# Patient Record
Sex: FEMALE | Race: WHITE | NOT HISPANIC OR LATINO | Employment: FULL TIME | URBAN - METROPOLITAN AREA
[De-identification: names, ages, dates, MRNs, and addresses within clinical notes are randomized per-mention and may not be internally consistent; named-entity substitution may affect disease eponyms.]

---

## 2023-12-20 ENCOUNTER — TELEPHONE (OUTPATIENT)
Dept: BEHAVIORAL HEALTH | Facility: CLINIC | Age: 28
End: 2023-12-20

## 2023-12-20 ENCOUNTER — HOSPITAL ENCOUNTER (INPATIENT)
Age: 28
End: 2023-12-20
Attending: PSYCHIATRY & NEUROLOGY | Admitting: PSYCHIATRY & NEUROLOGY
Payer: COMMERCIAL

## 2023-12-20 ENCOUNTER — HOSPITAL ENCOUNTER (OUTPATIENT)
Facility: CLINIC | Age: 28
Setting detail: OBSERVATION
Discharge: HOME | End: 2023-12-22
Attending: EMERGENCY MEDICINE | Admitting: EMERGENCY MEDICINE
Payer: COMMERCIAL

## 2023-12-20 DIAGNOSIS — F43.10 PTSD (POST-TRAUMATIC STRESS DISORDER): Chronic | ICD-10-CM

## 2023-12-20 DIAGNOSIS — F31.30 BIPOLAR I DISORDER, MOST RECENT EPISODE DEPRESSED (H): ICD-10-CM

## 2023-12-20 DIAGNOSIS — T50.902A INTENTIONAL DRUG OVERDOSE, INITIAL ENCOUNTER (H): ICD-10-CM

## 2023-12-20 DIAGNOSIS — F60.3 BORDERLINE PERSONALITY DISORDER (H): Chronic | ICD-10-CM

## 2023-12-20 PROBLEM — T14.91XA SUICIDE ATTEMPT (H): Status: ACTIVE | Noted: 2023-12-20

## 2023-12-20 PROBLEM — F10.920 ALCOHOLIC INTOXICATION WITHOUT COMPLICATION (H): Status: ACTIVE | Noted: 2023-12-20

## 2023-12-20 LAB
ALBUMIN SERPL BCG-MCNC: 3.9 G/DL (ref 3.5–5.2)
ALP SERPL-CCNC: 62 U/L (ref 40–150)
ALT SERPL W P-5'-P-CCNC: 40 U/L (ref 0–50)
ANION GAP SERPL CALCULATED.3IONS-SCNC: 8 MMOL/L (ref 7–15)
APAP SERPL-MCNC: <5 UG/ML (ref 10–30)
AST SERPL W P-5'-P-CCNC: 42 U/L (ref 0–45)
BASOPHILS # BLD AUTO: 0 10E3/UL (ref 0–0.2)
BASOPHILS NFR BLD AUTO: 0 %
BILIRUB SERPL-MCNC: 0.2 MG/DL
BUN SERPL-MCNC: 10.6 MG/DL (ref 6–20)
CALCIUM SERPL-MCNC: 8.1 MG/DL (ref 8.6–10)
CHLORIDE SERPL-SCNC: 108 MMOL/L (ref 98–107)
CREAT SERPL-MCNC: 0.86 MG/DL (ref 0.51–0.95)
DEPRECATED HCO3 PLAS-SCNC: 25 MMOL/L (ref 22–29)
EGFRCR SERPLBLD CKD-EPI 2021: >90 ML/MIN/1.73M2
EOSINOPHIL # BLD AUTO: 0.1 10E3/UL (ref 0–0.7)
EOSINOPHIL NFR BLD AUTO: 1 %
ERYTHROCYTE [DISTWIDTH] IN BLOOD BY AUTOMATED COUNT: 12.5 % (ref 10–15)
ETHANOL SERPL-MCNC: 0.21 G/DL
GLUCOSE SERPL-MCNC: 106 MG/DL (ref 70–99)
HCT VFR BLD AUTO: 34.8 % (ref 35–47)
HGB BLD-MCNC: 11.8 G/DL (ref 11.7–15.7)
IMM GRANULOCYTES # BLD: 0 10E3/UL
IMM GRANULOCYTES NFR BLD: 0 %
LYMPHOCYTES # BLD AUTO: 2.6 10E3/UL (ref 0.8–5.3)
LYMPHOCYTES NFR BLD AUTO: 55 %
MAGNESIUM SERPL-MCNC: 2.1 MG/DL (ref 1.7–2.3)
MCH RBC QN AUTO: 33.4 PG (ref 26.5–33)
MCHC RBC AUTO-ENTMCNC: 33.9 G/DL (ref 31.5–36.5)
MCV RBC AUTO: 99 FL (ref 78–100)
MONOCYTES # BLD AUTO: 0.5 10E3/UL (ref 0–1.3)
MONOCYTES NFR BLD AUTO: 9 %
NEUTROPHILS # BLD AUTO: 1.7 10E3/UL (ref 1.6–8.3)
NEUTROPHILS NFR BLD AUTO: 35 %
NRBC # BLD AUTO: 0 10E3/UL
NRBC BLD AUTO-RTO: 0 /100
PHOSPHATE SERPL-MCNC: 2.9 MG/DL (ref 2.5–4.5)
PLATELET # BLD AUTO: 236 10E3/UL (ref 150–450)
POTASSIUM SERPL-SCNC: 3.9 MMOL/L (ref 3.4–5.3)
PROT SERPL-MCNC: 6.4 G/DL (ref 6.4–8.3)
RBC # BLD AUTO: 3.53 10E6/UL (ref 3.8–5.2)
SALICYLATES SERPL-MCNC: <0.3 MG/DL
SODIUM SERPL-SCNC: 141 MMOL/L (ref 135–145)
WBC # BLD AUTO: 4.9 10E3/UL (ref 4–11)

## 2023-12-20 PROCEDURE — G0378 HOSPITAL OBSERVATION PER HR: HCPCS

## 2023-12-20 PROCEDURE — 36415 COLL VENOUS BLD VENIPUNCTURE: CPT | Performed by: STUDENT IN AN ORGANIZED HEALTH CARE EDUCATION/TRAINING PROGRAM

## 2023-12-20 PROCEDURE — 80179 DRUG ASSAY SALICYLATE: CPT | Performed by: STUDENT IN AN ORGANIZED HEALTH CARE EDUCATION/TRAINING PROGRAM

## 2023-12-20 PROCEDURE — 99285 EMERGENCY DEPT VISIT HI MDM: CPT | Mod: 25

## 2023-12-20 PROCEDURE — 85025 COMPLETE CBC W/AUTO DIFF WBC: CPT | Performed by: STUDENT IN AN ORGANIZED HEALTH CARE EDUCATION/TRAINING PROGRAM

## 2023-12-20 PROCEDURE — 80053 COMPREHEN METABOLIC PANEL: CPT | Performed by: STUDENT IN AN ORGANIZED HEALTH CARE EDUCATION/TRAINING PROGRAM

## 2023-12-20 PROCEDURE — 82077 ASSAY SPEC XCP UR&BREATH IA: CPT | Performed by: STUDENT IN AN ORGANIZED HEALTH CARE EDUCATION/TRAINING PROGRAM

## 2023-12-20 PROCEDURE — 83735 ASSAY OF MAGNESIUM: CPT | Performed by: NURSE PRACTITIONER

## 2023-12-20 PROCEDURE — 96360 HYDRATION IV INFUSION INIT: CPT

## 2023-12-20 PROCEDURE — 250N000011 HC RX IP 250 OP 636: Performed by: NURSE PRACTITIONER

## 2023-12-20 PROCEDURE — 96361 HYDRATE IV INFUSION ADD-ON: CPT

## 2023-12-20 PROCEDURE — 250N000013 HC RX MED GY IP 250 OP 250 PS 637: Performed by: NURSE PRACTITIONER

## 2023-12-20 PROCEDURE — 80143 DRUG ASSAY ACETAMINOPHEN: CPT | Performed by: STUDENT IN AN ORGANIZED HEALTH CARE EDUCATION/TRAINING PROGRAM

## 2023-12-20 PROCEDURE — 258N000003 HC RX IP 258 OP 636: Performed by: EMERGENCY MEDICINE

## 2023-12-20 PROCEDURE — 84100 ASSAY OF PHOSPHORUS: CPT | Performed by: NURSE PRACTITIONER

## 2023-12-20 RX ORDER — LAMOTRIGINE 100 MG/1
100 TABLET ORAL DAILY
COMMUNITY
Start: 2023-11-24

## 2023-12-20 RX ORDER — RISPERIDONE 0.5 MG/1
0.5 TABLET ORAL DAILY
COMMUNITY
Start: 2023-12-11 | End: 2023-12-22

## 2023-12-20 RX ORDER — MULTIPLE VITAMINS W/ MINERALS TAB 9MG-400MCG
1 TAB ORAL DAILY
Status: DISCONTINUED | OUTPATIENT
Start: 2023-12-21 | End: 2023-12-22 | Stop reason: HOSPADM

## 2023-12-20 RX ORDER — LAMOTRIGINE 100 MG/1
100 TABLET ORAL DAILY
Status: DISCONTINUED | OUTPATIENT
Start: 2023-12-20 | End: 2023-12-22 | Stop reason: HOSPADM

## 2023-12-20 RX ORDER — HYDROXYZINE PAMOATE 25 MG/1
25-50 CAPSULE ORAL 2 TIMES DAILY PRN
COMMUNITY
Start: 2023-11-24 | End: 2023-12-22

## 2023-12-20 RX ORDER — FLUMAZENIL 0.1 MG/ML
0.2 INJECTION, SOLUTION INTRAVENOUS
Status: DISCONTINUED | OUTPATIENT
Start: 2023-12-20 | End: 2023-12-21

## 2023-12-20 RX ORDER — OLANZAPINE 5 MG/1
5-10 TABLET, ORALLY DISINTEGRATING ORAL EVERY 6 HOURS PRN
Status: DISCONTINUED | OUTPATIENT
Start: 2023-12-20 | End: 2023-12-22 | Stop reason: HOSPADM

## 2023-12-20 RX ORDER — HALOPERIDOL 5 MG/ML
2.5-5 INJECTION INTRAMUSCULAR EVERY 6 HOURS PRN
Status: DISCONTINUED | OUTPATIENT
Start: 2023-12-20 | End: 2023-12-22 | Stop reason: HOSPADM

## 2023-12-20 RX ORDER — DIAZEPAM 5 MG
10 TABLET ORAL EVERY 30 MIN PRN
Status: DISCONTINUED | OUTPATIENT
Start: 2023-12-20 | End: 2023-12-21

## 2023-12-20 RX ORDER — RISPERIDONE 0.5 MG/1
0.5 TABLET ORAL DAILY
Status: DISCONTINUED | OUTPATIENT
Start: 2023-12-21 | End: 2023-12-21

## 2023-12-20 RX ORDER — SODIUM CHLORIDE/ALOE VERA
GEL (GRAM) NASAL 4 TIMES DAILY PRN
Status: DISCONTINUED | OUTPATIENT
Start: 2023-12-20 | End: 2023-12-20

## 2023-12-20 RX ORDER — DIAZEPAM 10 MG/2ML
5-10 INJECTION, SOLUTION INTRAMUSCULAR; INTRAVENOUS EVERY 30 MIN PRN
Status: DISCONTINUED | OUTPATIENT
Start: 2023-12-20 | End: 2023-12-21

## 2023-12-20 RX ORDER — FOLIC ACID 1 MG/1
1 TABLET ORAL DAILY
Status: DISCONTINUED | OUTPATIENT
Start: 2023-12-21 | End: 2023-12-21

## 2023-12-20 RX ORDER — ONDANSETRON 4 MG/1
4 TABLET, ORALLY DISINTEGRATING ORAL EVERY 6 HOURS PRN
Status: DISCONTINUED | OUTPATIENT
Start: 2023-12-20 | End: 2023-12-22 | Stop reason: HOSPADM

## 2023-12-20 RX ORDER — ONDANSETRON 4 MG/1
4 TABLET, ORALLY DISINTEGRATING ORAL ONCE
Status: COMPLETED | OUTPATIENT
Start: 2023-12-20 | End: 2023-12-20

## 2023-12-20 RX ORDER — LAMOTRIGINE 100 MG/1
100 TABLET ORAL DAILY
Status: DISCONTINUED | OUTPATIENT
Start: 2023-12-21 | End: 2023-12-20

## 2023-12-20 RX ADMIN — ONDANSETRON 4 MG: 4 TABLET, ORALLY DISINTEGRATING ORAL at 18:41

## 2023-12-20 RX ADMIN — SODIUM CHLORIDE 1000 ML: 9 INJECTION, SOLUTION INTRAVENOUS at 11:26

## 2023-12-20 RX ADMIN — LAMOTRIGINE 100 MG: 100 TABLET ORAL at 22:29

## 2023-12-20 ASSESSMENT — ACTIVITIES OF DAILY LIVING (ADL)
ADLS_ACUITY_SCORE: 35

## 2023-12-20 ASSESSMENT — LIFESTYLE VARIABLES
ANXIETY: NO ANXIETY, AT EASE
AGITATION: NORMAL ACTIVITY
AUDITORY DISTURBANCES: NOT PRESENT
PAROXYSMAL SWEATS: 2
NAUSEA AND VOMITING: NO NAUSEA AND NO VOMITING
TREMOR: NO TREMOR
ORIENTATION AND CLOUDING OF SENSORIUM: ORIENTED AND CAN DO SERIAL ADDITIONS
HEADACHE, FULLNESS IN HEAD: NOT PRESENT
TOTAL SCORE: 2
VISUAL DISTURBANCES: NOT PRESENT

## 2023-12-20 NOTE — ED NOTES
[facility]  ED to EMPATH Checklist:      Goal for EMPATH: Suicidality    Current Behavior: Calm, Cooperative, and Sleeping    Safety Concerns: Suicidal, attempted overdose with medications.     Legal Hold Status: [facility]    Medically Cleared by ED provider: Yes    Patient Therapeutically Searched: Therapeutic search by ED staff (strings, belts, shoes, pockets, electronics, etc.)    Belongings: In room locker    Independent Ambulation at Baseline: Yes/No: Yes    Participates in Care/Conversation: Yes/No: Yes    Patient Informed about EMPATH: Yes/No: Yes    DEC: Ordered and completed    Patient Ready to be Transferred to EMPATH? Yes/No: Yes

## 2023-12-20 NOTE — ED TRIAGE NOTES
"Patient BIBA from apt in [place]. States that over the last 2 days has drank 1 L of vodka. At around 0900 patient took \"most\" of a bottle of hydroxyzine (25 mg tablets) and \"part\" of a bottle of Risperdal (0.5 mg tablets). States that this was an attempt to hurt herself and that she is \"done.\"     Triage Assessment (Adult)       Row Name 12/20/23 1030          Triage Assessment    Airway WDL WDL        Respiratory WDL    Respiratory WDL WDL        Skin Circulation/Temperature WDL    Skin Circulation/Temperature WDL WDL        Cardiac WDL    Cardiac WDL WDL        Peripheral/Neurovascular WDL    Peripheral Neurovascular WDL WDL        Cognitive/Neuro/Behavioral WDL    Cognitive/Neuro/Behavioral WDL --  intoxicated                     "

## 2023-12-20 NOTE — ED PROVIDER NOTES
ED ATTENDING PHYSICIAN NOTE:   I evaluated this patient in conjunction with Betty Wharton PA-C  I have participated in the care of the patient and personally performed key elements of the history, exam, and medical decision making.      HPI:   Myra Melo is a 28 year old female who presents after an intentional overdose on Risperdal and hydroxyzine in addition to drinking vodka last night and this morning.  She told her mother about her overdose which prompted her to come to the ER.  She admits that this was an intent to harm herself.  She denies taking anything else.  She notes that she is fatigued and feels somewhat short of breath on arrival to the ED.    Independent Historian:   None - Patient Only    Review of External Notes:   None     EXAM:   Physical Exam  Vitals and nursing note reviewed.   Constitutional:       General: She is not in acute distress.     Appearance: She is not ill-appearing.   HENT:      Head: Normocephalic and atraumatic.      Right Ear: External ear normal.      Left Ear: External ear normal.      Nose: Nose normal.      Mouth/Throat:      Mouth: Mucous membranes are moist.   Eyes:      Extraocular Movements: Extraocular movements intact.      Conjunctiva/sclera: Conjunctivae normal.   Cardiovascular:      Rate and Rhythm: Normal rate and regular rhythm.      Heart sounds: No murmur heard.  Pulmonary:      Effort: Pulmonary effort is normal. No respiratory distress.      Breath sounds: Normal breath sounds. No wheezing, rhonchi or rales.   Abdominal:      General: Abdomen is flat. Bowel sounds are normal. There is no distension.      Palpations: Abdomen is soft.      Tenderness: There is no abdominal tenderness. There is no guarding or rebound.   Musculoskeletal:         General: No deformity or signs of injury.      Cervical back: Normal range of motion and neck supple.   Skin:     General: Skin is warm and dry.      Findings: No rash.   Neurological:      Mental Status: She is  alert and oriented to person, place, and time.   Psychiatric:         Mood and Affect: Mood is depressed.         Behavior: Behavior normal.         Thought Content: Thought content includes suicidal ideation. Thought content includes suicidal plan.           Independent Interpretation (X-rays, CTs, rhythm strip):  None    Consultations/Discussion of Management or Tests:  1057 the PA discussed with poison control regarding the patient's overdose and they recommend observation for approximately 3 hours.    Social Determinants of Health affecting care:   None     MEDICAL DECISION MAKING/ASSESSMENT AND PLAN:   28-year-old female presenting with an intentional overdose.  Her lab work shows no signs of any other overdoses other than alcohol.  She is mildly hypotensive which responded to fluids but otherwise vitally stable.  We discussed with poison control and they recommended observation for several hours before they can be medically cleared.  Once she was medically cleared, we had her talk to DEC and they recommend that she come to empath once she is somewhat more alert.  Will continue to observe in the ED until she is appropriate for empath.  Patient was endorsed to Dr. Jones at the end of shift.     DIAGNOSIS:     ICD-10-CM    1. Intentional drug overdose, initial encounter (H)  T50.902A       2. Alcoholic intoxication without complication (H24)  F10.920                DISPOSITION:   Endorsed to Dr. Jones at end of shift pending clearance for empath once she is clinically sober.     Adeel Pederson MD  12/20/2023  [facility] EMERGENCY DEPT       Adeel Pederson MD  12/20/23 2245

## 2023-12-20 NOTE — ED NOTES
Sitter at bedside, stating that she is needed upstairs back on the floor. Charge RN notified that a sitter is needed for patient.

## 2023-12-20 NOTE — ED PROVIDER NOTES
"                                  Emergency Department                         Assumed Care Note      Patient signed out to me by Dr Pederson.    Briefly, Myra Melo is a 28 year old female is being evaluated for intentional overdose. Patient has been medically cleared from poison control and prior shift ED physician. Patient was evaluated by DEC  who recommends inpatient mental health admission.    /51   Pulse 101   Temp 98.5  F (36.9  C) (Oral)   Resp 19   Ht 1.651 m (5' 5\")   Wt 86.2 kg (190 lb)   SpO2 96%   BMI 31.62 kg/m      Thus far, studies reveal:     Labs Ordered and Resulted from Time of ED Arrival to Time of ED Departure   COMPREHENSIVE METABOLIC PANEL - Abnormal       Result Value    Sodium 141      Potassium 3.9      Carbon Dioxide (CO2) 25      Anion Gap 8      Urea Nitrogen 10.6      Creatinine 0.86      GFR Estimate >90      Calcium 8.1 (*)     Chloride 108 (*)     Glucose 106 (*)     Alkaline Phosphatase 62      AST 42      ALT 40      Protein Total 6.4      Albumin 3.9      Bilirubin Total 0.2     ETHYL ALCOHOL LEVEL - Abnormal    Alcohol ethyl 0.21 (*)    ACETAMINOPHEN LEVEL - Abnormal    Acetaminophen <5.0 (*)    CBC WITH PLATELETS AND DIFFERENTIAL - Abnormal    WBC Count 4.9      RBC Count 3.53 (*)     Hemoglobin 11.8      Hematocrit 34.8 (*)     MCV 99      MCH 33.4 (*)     MCHC 33.9      RDW 12.5      Platelet Count 236      % Neutrophils 35      % Lymphocytes 55      % Monocytes 9      % Eosinophils 1      % Basophils 0      % Immature Granulocytes 0      NRBCs per 100 WBC 0      Absolute Neutrophils 1.7      Absolute Lymphocytes 2.6      Absolute Monocytes 0.5      Absolute Eosinophils 0.1      Absolute Basophils 0.0      Absolute Immature Granulocytes 0.0      Absolute NRBCs 0.0     SALICYLATE LEVEL - Normal    Salicylate <0.3         No orders to display       Pending studies include: None    Plan from sign out is: Once patient clinically sober and awake, able to " transition to Empath unit for further treatment/evaluation.    Progress notes:  ED Course as of 12/20/23 1709   Wed Dec 20, 2023   1709 Patient awake and willing to go to EMPATH unit.        Clinical impression:  1. Intentional drug overdose, initial encounter (H)    2. Alcoholic intoxication without complication (H24)          Disposition:  The patient was transferred to EMPATH.    Clem Jones DO  12/20/2023       Clem Jones DO  12/20/23 2056

## 2023-12-20 NOTE — ED NOTES
Bed: ED04  Expected date:   Expected time:   Means of arrival:   [place]  [facility] [place] eta 1023
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
## 2023-12-20 NOTE — ED NOTES
Patient sitting in bed calmly. Mother at bedside. No sitter available. On all monitors. Update given and lunch ordered.
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
## 2023-12-21 ENCOUNTER — TELEPHONE (OUTPATIENT)
Dept: BEHAVIORAL HEALTH | Facility: CLINIC | Age: 28
End: 2023-12-21
Payer: COMMERCIAL

## 2023-12-21 LAB
AMPHETAMINES UR QL SCN: ABNORMAL
BARBITURATES UR QL SCN: ABNORMAL
BENZODIAZ UR QL SCN: ABNORMAL
BZE UR QL SCN: ABNORMAL
CANNABINOIDS UR QL SCN: ABNORMAL
FENTANYL UR QL: ABNORMAL
HCG UR QL: NEGATIVE
OPIATES UR QL SCN: ABNORMAL
PCP QUAL URINE (ROCHE): ABNORMAL
SARS-COV-2 RNA RESP QL NAA+PROBE: NEGATIVE

## 2023-12-21 PROCEDURE — 80307 DRUG TEST PRSMV CHEM ANLYZR: CPT | Performed by: NURSE PRACTITIONER

## 2023-12-21 PROCEDURE — 87635 SARS-COV-2 COVID-19 AMP PRB: CPT | Performed by: NURSE PRACTITIONER

## 2023-12-21 PROCEDURE — G0378 HOSPITAL OBSERVATION PER HR: HCPCS

## 2023-12-21 PROCEDURE — 99223 1ST HOSP IP/OBS HIGH 75: CPT | Performed by: PSYCHIATRY & NEUROLOGY

## 2023-12-21 PROCEDURE — 250N000013 HC RX MED GY IP 250 OP 250 PS 637: Performed by: NURSE PRACTITIONER

## 2023-12-21 PROCEDURE — 81025 URINE PREGNANCY TEST: CPT | Performed by: PSYCHIATRY & NEUROLOGY

## 2023-12-21 RX ORDER — RISPERIDONE 0.5 MG/1
0.5 TABLET ORAL DAILY
Status: DISCONTINUED | OUTPATIENT
Start: 2023-12-22 | End: 2023-12-22 | Stop reason: HOSPADM

## 2023-12-21 RX ADMIN — LAMOTRIGINE 100 MG: 100 TABLET ORAL at 09:55

## 2023-12-21 RX ADMIN — MULTIPLE VITAMINS W/ MINERALS TAB 1 TABLET: TAB at 09:55

## 2023-12-21 ASSESSMENT — ACTIVITIES OF DAILY LIVING (ADL)
ADLS_ACUITY_SCORE: 35

## 2023-12-21 ASSESSMENT — LIFESTYLE VARIABLES
AGITATION: NORMAL ACTIVITY
ANXIETY: NO ANXIETY, AT EASE
HEADACHE, FULLNESS IN HEAD: NOT PRESENT
TOTAL SCORE: 2
AUDITORY DISTURBANCES: NOT PRESENT
PAROXYSMAL SWEATS: 2
VISUAL DISTURBANCES: NOT PRESENT
ORIENTATION AND CLOUDING OF SENSORIUM: ORIENTED AND CAN DO SERIAL ADDITIONS
NAUSEA AND VOMITING: NO NAUSEA AND NO VOMITING
TREMOR: NO TREMOR

## 2023-12-21 NOTE — CONSULTS
"Triage & Transition Services, Extended Care         Patient: Myra goes by \"Myra,\" uses she/her pronouns  Date of Service: December 21, 2023  Site of Service: [facility] EMERGENCY DEPT                             [id number]     Patient is followed related to: boarding status  Notable observations from today's encounter include: Attempted to meet with patient to discuss possible treatment options and to possibly complete an assessment.  While setting up the meeting with Empath staff, Empath staff stated \"she is declining to meet with you\".  This staff asked if for right now or permanently empath staff stated \"sounds like permanently\".  REYMUNDO consult will be closed at this time.        KALYAN Deshpande   "
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
## 2023-12-21 NOTE — ED NOTES
Pt awake to use BR and get water, CIWA score- 2 and pt denies any withdrawal symptoms when asked, mild diaphoresis noted, otherwise pt in no apparent distress or discomfort. Pt declines any medications offered at that time.

## 2023-12-21 NOTE — PROGRESS NOTES
Pt has been sleeping in recliner most of morning. Awakens easily to voice. CIWA discontinued this shift, patient denying any signs of withdrawal. VSS. Pt declined REYMUNDO assessment today. Pt eating minimally, 25% of breakfast and lunch, notes that she is just tired and rest is her main concern at this time. Pt met with provider and plan to remain on inpatient waitlist voluntarily. Reports SI is less intense than yesterday but still present. No changes to medications today.

## 2023-12-21 NOTE — ED PROVIDER NOTES
EmPATH Unit - Initial Psychiatric Observation Note  [facility] Emergency Department  Observation Initiation Date: Dec 20, 2023    Myra Melo MRN: [id number]   Age: 28 year old YOB: 1995     History     Chief Complaint   Patient presents with    Drug Overdose     HPI  Myra Melo is a 28 year old female with a past history notable for bipolar 1 disorder, PTSD, and borderline personality disorder who presented to the emergency department after an overdose with suicidal intent.  Records indicate she had overdosed on a large quantity of Risperdal and hydroxyzine combined with alcohol.  She was monitored in the emergency department until she was determined to be medically stable then transferred to the EmPATH unit for psychiatric assessment.  She is now approaching 24 hours in the emergency department.  On examination, the patient was sleeping comfortably in her recliner.  She accompanied me to the interview room for our meeting.  She reports that she feels slightly better in comparison to her initial arrival.  She endorses residual depressed mood while admitting that she has become increasingly frustrated with interpersonal conflicts at home involving her mother and sister.  She relates the recent overdose as being related to those ongoing conflicts.  She feels as though they often team up against her.  Moving out of the home is not an option at the moment leading her to feel helpless and hopeless in that situation.  During a moment where she was feeling overwhelmed by that scenario, she consumed a large quantity of alcohol and impulsively overdosed on a combination of Risperdal and hydroxyzine.  Today, she denied active suicidal thoughts.  She denied psychotic symptoms.  Prior to this incident, she interprets making progress in managing symptoms of her bipolar disorder and was content with her medication plan.  She is not seeking any additional medication changes today and is happy to restart  "her medications.  She was made aware of her treatment plan and is in agreement.    Past Medical History  No past medical history on file.  No past surgical history on file.  hydrOXYzine ligia (VISTARIL) 25 MG capsule  lamoTRIgine (LAMICTAL) 100 MG tablet  risperiDONE (RISPERDAL) 0.5 MG tablet      Allergies   Allergen Reactions    Zoloft [Sertraline]      Family History  No family history on file.  Social History           Review of Systems  A medically appropriate review of systems was performed with pertinent positives and negatives noted in the HPI, and all other systems negative.    Physical Examination   BP: 107/65  Pulse: 98  Temp: 98.5  F (36.9  C)  Resp: 16  Height: 165.1 cm (5' 5\")  Weight: 86.2 kg (190 lb)  SpO2: 96 %    Physical Exam  General: Appears stated age.   Neuro: Alert and fully oriented. Extremities appear to demonstrate normal strength on visual inspection.   Integumentary/Skin: no rash visualized, normal color    Psychiatric Examination   Appearance: fatigued  Attitude:  cooperative  Eye Contact:  fair  Mood:  better  Affect:  intensity is blunted  Speech:  clear, coherent  Psychomotor Behavior:  no evidence of tardive dyskinesia, dystonia, or tics  Thought Process:  logical and linear  Associations:  no loose associations  Thought Content:  no evidence of suicidal ideation or homicidal ideation and no evidence of psychotic thought  Insight:  fair  Judgement:  fair  Oriented to:  time, person, and place  Attention Span and Concentration:  fair  Recent and Remote Memory:  fair  Language: able to name/identify objects without impairment  Fund of Knowledge: intact with awareness of current and past events    ED Course     ED Course as of 12/21/23 1008   Wed Dec 20, 2023   1057 Consulted with poison control regarding patient   1709 Patient awake and willing to go to EMPATH unit.        Labs Ordered and Resulted from Time of ED Arrival to Time of ED Departure   COMPREHENSIVE METABOLIC PANEL - " Abnormal       Result Value    Sodium 141      Potassium 3.9      Carbon Dioxide (CO2) 25      Anion Gap 8      Urea Nitrogen 10.6      Creatinine 0.86      GFR Estimate >90      Calcium 8.1 (*)     Chloride 108 (*)     Glucose 106 (*)     Alkaline Phosphatase 62      AST 42      ALT 40      Protein Total 6.4      Albumin 3.9      Bilirubin Total 0.2     ETHYL ALCOHOL LEVEL - Abnormal    Alcohol ethyl 0.21 (*)    ACETAMINOPHEN LEVEL - Abnormal    Acetaminophen <5.0 (*)    CBC WITH PLATELETS AND DIFFERENTIAL - Abnormal    WBC Count 4.9      RBC Count 3.53 (*)     Hemoglobin 11.8      Hematocrit 34.8 (*)     MCV 99      MCH 33.4 (*)     MCHC 33.9      RDW 12.5      Platelet Count 236      % Neutrophils 35      % Lymphocytes 55      % Monocytes 9      % Eosinophils 1      % Basophils 0      % Immature Granulocytes 0      NRBCs per 100 WBC 0      Absolute Neutrophils 1.7      Absolute Lymphocytes 2.6      Absolute Monocytes 0.5      Absolute Eosinophils 0.1      Absolute Basophils 0.0      Absolute Immature Granulocytes 0.0      Absolute NRBCs 0.0     SALICYLATE LEVEL - Normal    Salicylate <0.3     MAGNESIUM - Normal    Magnesium 2.1     PHOSPHORUS - Normal    Phosphorus 2.9     HCG QUALITATIVE URINE   COVID-19 VIRUS (CORONAVIRUS) BY PCR       Assessments & Plan (with Medical Decision Making)   Patient presenting with a suicide attempt involving an overdose of Risperdal, hydroxyzine, combined with alcohol.  Ongoing interpersonal conflicts with her mother and sister are described as being contributory.  The overdose appears to have been impulsive and associated with emotional distress stemming from a recent verbal conflict with family members.  Her treatment plan is focused on monitoring for mood stability while resuming her prior effective mood stabilizing medications. Nursing notes reviewed noting no acute issues.     I have reviewed the assessment completed by the [facility].     During the observation period, the patient  did not require medications for agitation, and did not require restraints/seclusion for patient and/or provider safety.     The patient was found to have a psychiatric condition that would benefit from an observation stay in the emergency department for further psychiatric stabilization and/or coordination of a safe disposition. The observation plan includes serial assessments of psychiatric condition, potential administration of medications if indicated, further disposition pending the patient's psychiatric course during the monitoring period.     Preliminary diagnosis:    ICD-10-CM    1. Intentional drug overdose, initial encounter (H)  T50.902A       2. Bipolar I disorder, most recent episode depressed (H)  F31.30       3. PTSD (post-traumatic stress disorder)  F43.10       4. Borderline personality disorder (H)  F60.3            Treatment Plan:  -Restart lamotrigine 100 mg daily for mood stabilization.  If residual mood symptoms persist, consider optimizing the dose towards 200 mg daily.  -Continue to hold Risperdal today given her recent overdose.  Plan to restart 0.5 mg daily tomorrow for ongoing mood stabilization.  -Urine drug screen and pregnancy test have been ordered  -Enter to observation status as we await bed availability to transfer to inpatient psychiatry.  If her presentation continues to improve, we will continue to revisit her disposition plan and update accordingly.    --  Daren Coelho MD   [facility] EMERGENCY DEPT  EmPATH Unit       Daren Coelho MD  12/21/23 4210

## 2023-12-21 NOTE — CARE PLAN
Myra Melo  December 20, 2023  Plan of Care Hand-off Note     Patient Care Path: inpatient mental health    Plan for Care:   At this time, pt continues to endorse thoughts of suicide with some intent.  Though she reports willingness to keep herself safe or notify staff if she is having urges to harm herself here in the hospital.  She made a serious attempt at suicide this morning, has also been drinking daily and engaging in SIB to her thighs.  Her mood has been quite labile recently with increased verbal and physical aggression towards her sister with whom she lives.  She is impulsive and this, combined with continued SI and daily use of alcohol, puts her at high risk for suicide at this time. Recommend admission for safety and stabilization.  As she metabolizes the alcohol and medications and becomes more able to attend independently to ADLs, would recommend transfer to [facility].    Identified Goals and Safety Issues: Attempted suicide by overdose on meds and vodka. Continues to have thoughts of suicide.  Reports willingness to contract for safety in hospital    Overview:  Delmis Melo (Mother) [phone]            Legal Status: Legal Status at Admission: Voluntary/Patient has signed consent for treatment    Psychiatry Consult: will see psychiatric provider once transfers to [facility]       Updated MD and RN regarding plan of care.           Daljit Garcia, [facility]

## 2023-12-21 NOTE — CARE PLAN
[facility] Progress Note    Client Name: Myra Melo  Date: December 20, 2023  Service Type:  Group Therapy  Facilitator:LEVI Lamar, [facility]          Response:  Patient did not participate in group.      WIL Pak

## 2023-12-21 NOTE — TELEPHONE ENCOUNTER
R: METRO +1 HOUR     [facility] Access Inpatient Bed Call Log 12/21/23 @ : 7:05 am:    Intake has called facilities that have not updated the bed status within the last 12 hours.                                             [facility] is at capacity                        [facility] is posting 0 beds. [phone]; per call at 7:05 am to Anthony, they are at cap.                [facility] is posting 0 beds. Negative covid required.                             [facility] is posting 0 beds. Neg covid. No high school or [facility]. [phone]; per call at 7:06 am to Ese, she does not know their availability so said to call back later.                [facility] is posting 0 beds. [phone]               [facility] is posting 0 beds. [phone]               [facility] is posting 2 beds for Young Adults age 18-26. Negative covid. [phone]; per call at 7:08 am to Fabrice, they have 5 y/a beds avail                [facility] is posting 0 beds                       [facility] is posting  0  beds [phone]                     [facility] is posting  0 beds. LOW acuity ONLY. Mixed unit 12+. Negative covid- [phone]               [facility] has 0 beds posted. No aggression. Negative Covid. Low acuity                [facility] is posting 0 beds. Negative covid. [phone] voicemail says not to leave a message.                [facility] is posting  1  beds. Low acuity. No current aggression. [phone]      Pt remains on the work list pending appropriate bed availability.

## 2023-12-21 NOTE — PROGRESS NOTES
28 year old female received from ED due to suicide attempt. Patient intentionally overdosed on Risperdal and hydroxyzine in addition to drinking vodka last night and this morning. Medically cleared by ED MD, [facility] Patient reports some dizziness when sitting up and walking. VSS. Also, reports mild nausea and abdominal pain, interested in taking Zofran. Patient minimally engaged in conversation, appears slightly sedated. Patient requesting to rest at this time.     Nursing and risk assessments completed. Assessments reviewed with LMHP and physician. Admission information reviewed with patient. [facility] and instructions on using the facility. Questions regarding EmPATH addressed. Pt safety search completed.

## 2023-12-21 NOTE — PROGRESS NOTES
"Triage & Transition Services, Extended Care       Patient: Myra goes by \"Myra,\" uses she/her pronouns  Date of Service: December 21, 2023  Site of Service: [facility] EMERGENCY DEPT                             [id number]    Patient is followed related to: boarding status  Notable observations from today's encounter include: Attempted to meet with patient to discuss possible treatment options and to possibly complete an assessment.  While setting up the meeting with Empath staff, Empath staff stated \"she is declining to meet with you\".  This staff asked if for right now or permanently empath staff stated \"sounds like permanently\".  REYMUNDO consult will be closed at this time.      KALYAN Deshpande       "

## 2023-12-21 NOTE — TELEPHONE ENCOUNTER
R: [place]  Access Inpatient Bed Call Log 12/21/23 @ 1:00am   Intake has called facilities that have not updated their bed status within the last 12 hours.??      *METRO:  [place] -- [facility]: @ cap per website.  [place] -- [facility]:  @ cap per website.  [place] -- [facility]: @ cap per website.  [place] -- [facility]: @ cap per website. Low acuity only.  [place] -- [facility]: @ cap per website.  [facility] -- [facility]: @ cap per website.  [facility]/ beds - POSTING 2 BEDS. Ages 18-25, Voluntary only, NO aggression/physical/sexual assault, violence hx or drug abuse. Negative Covid. - Not Age Appropriate.   [place] -- [facility]: @ cap per website.  [place] -- [facility]: @ cap per website.  [place] -- [facility]: @ cap per website. Not reviewing until 10AM.     *Metro + 1 Hour:  [facility] - @ cap per website. Mixed unit/Low acuity only.   [facility] -- @ cap per website. Low acuity, No aggression  [facility] -- @ cap per website.   [facility] - POSTING 1 BED. Low acuity only. No current aggression.        Pt remains on waitlist pending appropriate placement availability

## 2023-12-21 NOTE — ED NOTES

## 2023-12-21 NOTE — ED NOTES
Pt appears to have slept/rested the majority of the noc shift in her assigned recliner, respirations even and unlabored during 15 minute safety checks.

## 2023-12-22 ENCOUNTER — TELEPHONE (OUTPATIENT)
Dept: BEHAVIORAL HEALTH | Facility: CLINIC | Age: 28
End: 2023-12-22
Payer: COMMERCIAL

## 2023-12-22 VITALS
HEIGHT: 65 IN | HEART RATE: 83 BPM | OXYGEN SATURATION: 97 % | TEMPERATURE: 97.6 F | WEIGHT: 193.5 LBS | RESPIRATION RATE: 16 BRPM | SYSTOLIC BLOOD PRESSURE: 129 MMHG | DIASTOLIC BLOOD PRESSURE: 84 MMHG | BODY MASS INDEX: 32.24 KG/M2

## 2023-12-22 PROCEDURE — G0378 HOSPITAL OBSERVATION PER HR: HCPCS

## 2023-12-22 PROCEDURE — 99239 HOSP IP/OBS DSCHRG MGMT >30: CPT | Performed by: PSYCHIATRY & NEUROLOGY

## 2023-12-22 PROCEDURE — 250N000013 HC RX MED GY IP 250 OP 250 PS 637: Performed by: PSYCHIATRY & NEUROLOGY

## 2023-12-22 PROCEDURE — 250N000013 HC RX MED GY IP 250 OP 250 PS 637: Performed by: NURSE PRACTITIONER

## 2023-12-22 RX ORDER — LAMOTRIGINE 100 MG/1
100 TABLET ORAL DAILY
Status: CANCELLED | OUTPATIENT
Start: 2023-12-22

## 2023-12-22 RX ORDER — ACETAMINOPHEN 325 MG/1
650 TABLET ORAL EVERY 4 HOURS PRN
Status: CANCELLED | OUTPATIENT
Start: 2023-12-22

## 2023-12-22 RX ORDER — RISPERIDONE 0.5 MG/1
0.5 TABLET ORAL DAILY
Status: CANCELLED | OUTPATIENT
Start: 2023-12-22

## 2023-12-22 RX ORDER — LANOLIN ALCOHOL/MO/W.PET/CERES
3 CREAM (GRAM) TOPICAL
Status: CANCELLED | OUTPATIENT
Start: 2023-12-22

## 2023-12-22 RX ORDER — POLYETHYLENE GLYCOL 3350 17 G/17G
17 POWDER, FOR SOLUTION ORAL DAILY PRN
Status: CANCELLED | OUTPATIENT
Start: 2023-12-22

## 2023-12-22 RX ORDER — RISPERIDONE 0.5 MG/1
0.5 TABLET ORAL DAILY
Qty: 7 TABLET | Refills: 0 | Status: SHIPPED | OUTPATIENT
Start: 2023-12-22 | End: 2023-12-29

## 2023-12-22 RX ORDER — HYDROXYZINE PAMOATE 25 MG/1
25-50 CAPSULE ORAL 2 TIMES DAILY PRN
Qty: 14 CAPSULE | Refills: 0 | Status: SHIPPED | OUTPATIENT
Start: 2023-12-22 | End: 2023-12-29

## 2023-12-22 RX ORDER — OLANZAPINE 10 MG/2ML
10 INJECTION, POWDER, FOR SOLUTION INTRAMUSCULAR 3 TIMES DAILY PRN
Status: CANCELLED | OUTPATIENT
Start: 2023-12-22

## 2023-12-22 RX ORDER — MAGNESIUM HYDROXIDE/ALUMINUM HYDROXICE/SIMETHICONE 120; 1200; 1200 MG/30ML; MG/30ML; MG/30ML
30 SUSPENSION ORAL EVERY 4 HOURS PRN
Status: CANCELLED | OUTPATIENT
Start: 2023-12-22

## 2023-12-22 RX ORDER — OLANZAPINE 10 MG/1
10 TABLET ORAL 3 TIMES DAILY PRN
Status: CANCELLED | OUTPATIENT
Start: 2023-12-22

## 2023-12-22 RX ORDER — HYDROXYZINE HYDROCHLORIDE 25 MG/1
25 TABLET, FILM COATED ORAL EVERY 4 HOURS PRN
Status: CANCELLED | OUTPATIENT
Start: 2023-12-22

## 2023-12-22 RX ADMIN — MULTIPLE VITAMINS W/ MINERALS TAB 1 TABLET: TAB at 09:20

## 2023-12-22 RX ADMIN — MELATONIN 5 MG TABLET 5 MG: at 01:55

## 2023-12-22 RX ADMIN — LAMOTRIGINE 100 MG: 100 TABLET ORAL at 09:27

## 2023-12-22 RX ADMIN — OLANZAPINE 5 MG: 5 TABLET, ORALLY DISINTEGRATING ORAL at 01:55

## 2023-12-22 RX ADMIN — RISPERIDONE 0.5 MG: 0.5 TABLET ORAL at 09:20

## 2023-12-22 ASSESSMENT — ACTIVITIES OF DAILY LIVING (ADL)
ADLS_ACUITY_SCORE: 35

## 2023-12-22 ASSESSMENT — COLUMBIA-SUICIDE SEVERITY RATING SCALE - C-SSRS
TOTAL  NUMBER OF INTERRUPTED ATTEMPTS SINCE LAST CONTACT: NO
2. HAVE YOU ACTUALLY HAD ANY THOUGHTS OF KILLING YOURSELF?: NO
TOTAL  NUMBER OF ABORTED OR SELF INTERRUPTED ATTEMPTS SINCE LAST CONTACT: NO
ATTEMPT SINCE LAST CONTACT: NO
1. SINCE LAST CONTACT, HAVE YOU WISHED YOU WERE DEAD OR WISHED YOU COULD GO TO SLEEP AND NOT WAKE UP?: NO
6. HAVE YOU EVER DONE ANYTHING, STARTED TO DO ANYTHING, OR PREPARED TO DO ANYTHING TO END YOUR LIFE?: NO
LETHALITY/MEDICAL DAMAGE CODE MOST LETHAL ACTUAL ATTEMPT: MODERATE PHYSICAL DAMAGE, MEDICAL ATTENTION NEEDED
MOST LETHAL DATE: 66828

## 2023-12-22 NOTE — TELEPHONE ENCOUNTER
R: [place]  Access Inpatient Bed Call Log  12/22/2023 1:18 AM  Intake has called facilities that have not updated their bed status within the last 12 hours.??      ADULTS:     *METRO  [place] -- [facility]: @ cap per website.  [place] -- [facility]:  @ cap per website.  [place] -- [facility]: @ Cap per website  [place] -- [facility]: @ cap per website.   [place] -- [facility]: @cap per website.  [facility] -- [facility]: @ cap per website.  [place] -- [facility]/YA beds Posting 4 beds. Ages 18-25, Voluntary only, COVID test req'd, NO aggression, physical or sexual assault, violence hx or drug abuse, or psychosis  [place] -- [facility]: @ cap per website.  [place] -- [facility]: @ cap per website.  [place] -- [facility]:  @ Cap per website.      *[facility] +1 Hour  [facility]: @ Cap per website. Mixed unit/Low acuity only.    [facility]: Posting 1 bed.  Low acuity, No aggression   [facility]: @ cap per website   [facility]:  Posting 2 bed. Low acuity only. No current aggression.    Pt remains on waitlist pending appropriate placement availability.    R: Patient cleared and ready for behavioral bed placement: Yes    2:14AM Intake paged the resident.     2:32 AM Intake received a call from the resident accepting this pt to [facility].     2:44 AM Intake called [facility] and provided disposition to CRN. Charge will review the pt's chart and call intake back.     4:10 AM Intake called [facility] and provided placement information to pt's nurse, Kimberli.

## 2023-12-22 NOTE — ED NOTES
Intake called to inform writer that pt has been accepted to [address] by Dr[address] Levy. Writer called the unit to give report and was informed that they have another pt in the queue before this patient and will call for report when they are ready to take her. Unit phone number [phone].

## 2023-12-22 NOTE — TELEPHONE ENCOUNTER
12:36 PM: Intake was informed via EC/EmPATh chat that Pt is discharging home and can be removed from IP list.    12:50 PM: Intake called station 22 to inform that Pt has been removed from queue.    12:51 PM: Pt removed from work list. Intake no longer following.

## 2023-12-22 NOTE — DISCHARGE INSTRUCTIONS
Aftercare Plan    Follow up with established providers and supports as scheduled, including your therapist and psychiatrist. We discussed the importance of asking your therapist to increase the frequency of sessions to twice weekly to increase your supports following discharge. Continue taking medications as prescribed. Abstain from drugs and alcohol. Utilize your [facility] mental [facility] crisis team as needed. They are available 24/7. Contact information is listed below.     If I am feeling unsafe or I am in a crisis, I will:   Contact my established care providers   Call the [place] Suicide Prevention Lifeline: [phone]   Go to the nearest emergency room   Call [phone]     Warning signs that I or other people might notice when a crisis is developing for me: changes to sleep, appetite or mood, increased anger, agitation or irritability, feeling depressed or hopeless, spending more time alone or talking less, increased crying, decreased productivity, seeing or hearing things that aren't there, thoughts of not wanting to live anymore or of actually killing myself, thoughts of hurting others    Things I am able to do on my own to cope or help me feel better: watching a favorite tv show or movie, listening to music I enjoy, going outside and breathing fresh air, going for a walk or exercising, taking a shower or bath, a cold or hot beverage, a healthy snack, drawing/coloring/painting, journaling, singing or dancing, deep breathing     I can try practicing square breathing when I begin to feel anxious - inhale through the nose for the count of 4 and the first line on the square. Exhale through the mouth for the count of 4 for the second line of the square. Repeat to complete the square. Repeat the square as many times as needed.    I can also use my five senses to practice mindfulness and grounding. What are five things I can see, four things I can hear, three things I can feel, two things I can smell, and one thing I can  taste.     Things that I am able to do with others to cope or help me feel better: sometimes just talking or spending time with someone else, sharing a meal or having coffee, watching a movie or playing a game, going for a walk or exercising    I can also use community resources including mental health hotlines, [facility] crisis teams, or apps.     Things I can use or do for distraction: movies/tv, music, reading, games, drawing/coloring/painting or other art, essential oils, exercise, cleaning/organizing, puzzles, crossword puzzles, word search, Sudoku       I can also download a meditation or relaxation medina, like Calm, Headspace, or Insight Timer (all three offer a free version)    Changes I can make to support my mental health and wellness: Attend scheduled mental health therapy and psychiatric appointments. Take my medications as prescribed. Maintain a daily schedule/routine. Abstain from all mood altering substances, including drugs, alcohol, or medications not currently prescribed to me. Implement a self-care routine.      People in my life that I can ask for help: friends or family, trusted teachers/staff/colleagues, trusted members of my community or place of Yarsanism, mental health crisis lines, or 911    Your [facility] has a mental health crisis team you can call 24/7: [facility], [phone]    Other things that are important when I m in crisis: to remember that the feelings I am having right now are temporary, and it won't feel like this forever, and that it is okay and important to ask for help    Crisis Lines  Crisis Text Line  Text 691568  You will be connected with a trained live crisis counselor to provide support.    Por espanol, texto  EARLE a 674076 o texto a 442-AYUDAME en WhatsApp    [place] Line  1.800.SUICIDE [5927701]      Community Resources  Fast Tracker  Linking people to mental health and substance use disorder resources  [employer].org     Merged with [facility]  "Line  Peer to peer support  Monday thru Saturday, 12 pm to 10 pm  993.854.3271 or 6.059.349.3957  Text \"Support\" to 10539    National [place] on Mental Illness (COLTEN)  590.723.5820 or 1.888.COLTEN.HELPS      Mental Health Apps  My3  https://The Fabricpp.org/    VirtualHopeBox  https://Likeability/apps/virtual-hope-box/      Additional Information  Today you were seen by a licensed mental health professional through Triage and Transition services, Behavioral Healthcare Providers ([facility])  for a crisis assessment in the Emergency Department at [facility].  It is recommended that you follow up with your established providers (psychiatrist, mental health therapist, and/or primary care doctor - as relevant) as soon as possible. Coordinators from [facility] will be calling you in the next 24-48 hours to ensure that you have the resources you need.  You can also contact [facility] coordinators directly at [phone]. You may have been scheduled for or offered an appointment with a mental health provider. [facility] maintains an extensive network of licensed behavioral health providers to connect patients with the services they need.  We do not charge providers a fee to participate in our referral network.  We match patients with providers based on a patient's specific needs, insurance coverage, and location.  Our first effort will be to refer you to a provider within your care system, and will utilize providers outside your care system as needed.        "

## 2023-12-22 NOTE — PROGRESS NOTES
Discharge instructions reviewed with patient including follow-up care plan. Educated on medication regimen and advised not to stop prescribed medication without consulting their physician. Reviewed safety plan and outpatient resources.  Pt denies plan/intent to act and has contracted for safety and completed safety plan with [facility].  All belongings which were brought into the hospital have been returned to patient. Escorted off the unit at 2:30 PM accompanied by MERT Ramon.  Discharged to home in stable condition via taxi.
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
## 2023-12-22 NOTE — H&P
"  ----------------------------------------------------------------------------------------------------------  [facility]   Psychiatry History and Physical    Name: Myra Melo   MRN#: [id number]  Age: 28 year old YOB: 1995    Date of Admission: (Not on file)  Attending Physician: ***     Contacts:     Primary Outpatient Psychiatrist: *** M.D. @ ***   Primary Physician: Abbi Bal  Therapist: ***  [facility] : ***  Probation/: ***  Family Members: ***     Chief Concern:     \"***\"     History of Present Illness:     Myra Melo is a 28 year old female with previous psychiatric diagnoses of *** admitted from the {admitplace:826964} on 12/22/2023 due to concern for {psychadmitreason:032064} in the context of {admitcontext:991586}.    [facility]/DEC Assessment:  ***    ED/Hospital Course:  Myra Melo was medically cleared for admission to inpatient psychiatric unit. In the ED, *** include brief ED course including psychiatric medications given, reason why, & response.     Patient interview:  ***     Psychiatric Review of Systems:     Depressive:   Reports {Depressive Sxs:221032}    Denies {Depressive Sxs:056802}   Dysregulation:    Reports {Dysregulation sxs:400360}    Denies {Dysregulation sxs:323043}   Psychosis:    Reports {Psychotic Sxs:081385}   Denies {Psychotic Sxs:685063}  Lena:    Reports {Lena Sxs:836652}   Denies {Lena Sxs:363064}  Anxiety:    Reports {Anxiety Sxs:845917}   Denies {Anxiety Sxs:393845}  PTSD:    Reports {PTSD Sxs:591947}   Denies {PTSD Sxs:861657}  ADHD:    Reports {ADHD Sxs:328676}   Denies {ADHD Sxs:446643}  Disordered Eating:   Reports {ED sxs:225430}, {:702189}  Denies {ED sxs:657899}, {:968023}  Cluster B:   Reports {clusterbsx:306680}  Denies {clusterbsx:297874}     Medical Review of Systems:     The Review of Systems is negative other than what is noted in the HPI.     Psychiatric " History:     Prior diagnoses: Previous psychiatric diagnoses include ***.     Hospitalizations: {wmwunrso061683}. Most recent hospitalization was at *** in ***/*** for ***.     Court Commitments: None per {patientreportchartreview:445580}. *** Currently committed with [facility] until *** . [facility] meds include ***     Suicide attempts: None per {patientreportchartreview:489717}.     Self-injurious behavior: None per {patientreportchartreview:885604}.     Violence towards others: None per {patientreportchartreview:026875}.     ECT/TMS: None per {patientreportchartreview:163230}.    Past medications:   Per {patientreportchartreview:984327}: ***     Substance Use History:     Alcohol: {endorsedeny:467610} ***      Nicotine: {endorsedeny:711679} ***    Illicit Substances: {endorsedeny:863521} current or past addiction to {cannabis, cocaine, amphetamines, opiates/heroin, ecstasy, benzos, opi:949453}    Chemical Dependency Treatment: {endorsedeny:775608} history of chemical dependency treatment      Social History:     Upbringing: Grew up in ***.     Family/Relationships: Single ***  *** . Does *** not maintain contact with Her family.     Living Situation: Currently lives in *** town) in a *** [facility] .     Education: Highest level of education obtained is a *** GED High school diploma some college Bachelors Masters Degree     Occupation: Works as a ***     Legal: {endorsedeny:040579} history of legal issues.     Guns: {guns:649598}    Abuse/Trauma: {endorsedeny:936167} history of trauma      Service: None ***    Spirituality: ***     Hobbies/Interests: ***      Past Medical/Surgical History:     {:2600385}  Reports *** Denies history of: {Head trauma, seizures, HIV, hepatitis:587057}  No past medical history on file.    {:1367981}  No past surgical history on file.     Family History:     Psychiatric Family Hx: {psyfamhx:590266}  No family history on file.     Allergies:      Allergies    Allergen Reactions    Zoloft [Sertraline]         Medications:     No medications prior to admission.       See current inpatient medications below.     Vitals and Physical Exam:     There were no vitals taken for this visit.    See ED assessment note by ED physician on ***.     Labs and Imaging:     Recent Results (from the past 72 hour(s))   Comprehensive metabolic panel    Collection Time: 12/20/23 11:02 AM   Result Value Ref Range    Sodium 141 135 - 145 mmol/L    Potassium 3.9 3.4 - 5.3 mmol/L    Carbon Dioxide (CO2) 25 22 - 29 mmol/L    Anion Gap 8 7 - 15 mmol/L    Urea Nitrogen 10.6 6.0 - 20.0 mg/dL    Creatinine 0.86 0.51 - 0.95 mg/dL    GFR Estimate >90 >60 mL/min/1.73m2    Calcium 8.1 (L) 8.6 - 10.0 mg/dL    Chloride 108 (H) 98 - 107 mmol/L    Glucose 106 (H) 70 - 99 mg/dL    Alkaline Phosphatase 62 40 - 150 U/L    AST 42 0 - 45 U/L    ALT 40 0 - 50 U/L    Protein Total 6.4 6.4 - 8.3 g/dL    Albumin 3.9 3.5 - 5.2 g/dL    Bilirubin Total 0.2 <=1.2 mg/dL   Alcohol ethyl    Collection Time: 12/20/23 11:02 AM   Result Value Ref Range    Alcohol ethyl 0.21 (H) <=0.01 g/dL   Acetaminophen level    Collection Time: 12/20/23 11:02 AM   Result Value Ref Range    Acetaminophen <5.0 (L) 10.0 - 30.0 ug/mL   Salicylate level    Collection Time: 12/20/23 11:02 AM   Result Value Ref Range    Salicylate <0.3   mg/dL   CBC with platelets and differential    Collection Time: 12/20/23 11:02 AM   Result Value Ref Range    WBC Count 4.9 4.0 - 11.0 10e3/uL    RBC Count 3.53 (L) 3.80 - 5.20 10e6/uL    Hemoglobin 11.8 11.7 - 15.7 g/dL    Hematocrit 34.8 (L) 35.0 - 47.0 %    MCV 99 78 - 100 fL    MCH 33.4 (H) 26.5 - 33.0 pg    MCHC 33.9 31.5 - 36.5 g/dL    RDW 12.5 10.0 - 15.0 %    Platelet Count 236 150 - 450 10e3/uL    % Neutrophils 35 %    % Lymphocytes 55 %    % Monocytes 9 %    % Eosinophils 1 %    % Basophils 0 %    % Immature Granulocytes 0 %    NRBCs per 100 WBC 0 <1 /100    Absolute Neutrophils 1.7 1.6 - 8.3 10e3/uL     "Absolute Lymphocytes 2.6 0.8 - 5.3 10e3/uL    Absolute Monocytes 0.5 0.0 - 1.3 10e3/uL    Absolute Eosinophils 0.1 0.0 - 0.7 10e3/uL    Absolute Basophils 0.0 0.0 - 0.2 10e3/uL    Absolute Immature Granulocytes 0.0 <=0.4 10e3/uL    Absolute NRBCs 0.0 10e3/uL   Magnesium    Collection Time: 12/20/23 11:02 AM   Result Value Ref Range    Magnesium 2.1 1.7 - 2.3 mg/dL   Phosphorus    Collection Time: 12/20/23 11:02 AM   Result Value Ref Range    Phosphorus 2.9 2.5 - 4.5 mg/dL   Asymptomatic COVID-19 Virus (Coronavirus) by PCR Nose    Collection Time: 12/21/23 10:53 AM    Specimen: Nose; Swab   Result Value Ref Range    SARS CoV2 PCR Negative Negative   HCG qualitative urine    Collection Time: 12/21/23  5:04 PM   Result Value Ref Range    hCG Urine Qualitative Negative Negative   Urine Drug Screen Panel    Collection Time: 12/21/23  5:04 PM   Result Value Ref Range    Amphetamines Urine Screen Negative Screen Negative    Barbituates Urine Screen Negative Screen Negative    Benzodiazepine Urine Screen Negative Screen Negative    Cannabinoids Urine Screen Positive (A) Screen Negative    Cocaine Urine Screen Negative Screen Negative    Fentanyl Qual Urine Screen Negative Screen Negative    Opiates Urine Screen Negative Screen Negative    PCP Urine Screen Negative Screen Negative        Mental Status Examination:     Oriented to:  {KKorientationMSE:404139}  General:  {KKGeneral:505661}  Appearance:  {KKappearanceMSE:966495}  Behavior/Attitude:  {KKbehaviorMSE:694328}  Eye Contact:  { :680263}  Psychomotor: { :937503} {KKcatatoniaMSE:568104}  Speech:  {KKspeechMSE:713840}  Language: Fluent in English with appropriate syntax and vocabulary.  Mood:  \"***\"  Affect:  {KKaffectMSE:046141}  Thought Process:  { :344412}  Thought Content:  {KKthoughtcontentMSE:256970}; {KKdelusionsMSE:484576}  Associations:  {KKassociationsMSE:992505}  Insight:  {KKinsightMSE:693208} due to ***  Judgment:  {KKjudgementMSE:767757} due to ***  Impulse " control: { :657348}  Attention Span:  {KKattentionMSE:961996}  Concentration:  {KKconcentrationMSE:672831}  Recent and Remote Memory:  {KKmemoryMSE:503159}  Fund of Knowledge:  {KKintellectMSE:044440}  Muscle Strength and Tone: { :985251}  Gait and Station: { :835189}     Psychiatric Assessment:     Myra Melo is a 28 year old female previously diagnosed with *** who presented voluntarily/by *** with *** in the context of ***. Most recent psychiatric hospitalization was***. Significant symptoms on admission include ***. The MSE on admission was pertinent for***. Biological contributions to mental health presentation include *** (previous diagnosis, medications, substance use, PMH). Psychological contributions to mental health presentation include *** (insight, coping, personality). Social factors contributing to mental health presentation include *** (support system, interpersonal conflicts, work, education, legal, financial). Protective factors include ***.     In summary, the patient's reported symptoms of *** in the context of *** are consistent with ***. (OR Patient's definitive diagnosis is still in evolution; differential includes ***.) She will likely benefit from *** this admission.    Given that she currently has {psychadmitreason:893992}, patient warrants inpatient psychiatric hospitalization to maintain her safety.      Psychiatric Plan by Diagnosis      # ***  1. Medications:  - ***     2. Pertinent Labs/Monitoring:   - *** (QTc, medication levels, ANC, etc)     3. Additional Plans:  - Patient will be treated in therapeutic milieu with appropriate individual and group therapies as described  - ***    # ***  -     Psychiatric Hospital Course:      (***for discharge summary - explain in past tense what was done this hospitalization, include medication choices/changes/rationale/efficacy)  Myra Melo was [facility] to [facility] as a voluntary patient/ on a 72 hour hold***.   Medications:  PTA ***  & *** were continued.   PTA **** and *** were held due to ***.    New medications started at the time of admission include ***.     The risks, benefits, alternatives, and side effects were discussed and understood by the patient*** and other caregivers***.     Medical Assessment and Plan     Medical diagnoses to be addressed this admission:    # ***    # ***     Medical course: (***medical work up (ie: admission labs), PTA medical problems, consult summary and changes) Patient was physically examined by the ED prior to being transferred to the unit and was found to be medically stable and appropriate for admission.     Consults:  {consultpsych:405219}     Checklist     Legal Status: ***    Safety Assessment:        Risk Assessment:  Risk for harm is {harm:072019}.  Risk factors: {RF harm:201906}  Protective factors: {pf harm:706203}     SIO: ***    Dispo: TBD. Disposition pending clinical stabilization, medication optimization and development of an appropriate discharge plan.     Attestations:     ***Evening Admissions:***  Patient to be staffed with the attending physician in the morning.  ***  Psychiatry Resident Physician    ***Overnight/Daytime Admissions:***  ***, MS3  [facility] Medical Student    I was present with the medical student who participated in the service and in the documentation of the note.  I have verified the history and personally performed the physical exam and medical decision making. I agree with the assessment and plan of care as documented in the note.***    This patient was seen and discussed with my attending physician.  ***  Psychiatry Resident Physician

## 2023-12-22 NOTE — ED NOTES
Pt appears to have slept/rested in her assigned recliner most of the noc shift, respirations even and unlabored during 15 minute safety checks.

## 2023-12-22 NOTE — PROGRESS NOTES
"Pt reported to RN that she no longer wishes to transfer to inpatient setting. Pt notes that she is not feeling suicidal today but does report some concern that SI will return when she gets home and \"has to deal with her family\". She asks to be discharged with safety planning instead of inpatient. Pt is voluntary at this time so EMS cancelled and [facility] has been updated.     Pt met with ANAIS, Vane, and awaiting provider reassessment.   "

## 2023-12-22 NOTE — ED PROVIDER NOTES
"EmPATH Unit - Psychiatric Observation Discharge Summary  [facility] Emergency Department  Discharge Date: 12/22/2023    Myra Melo MRN: [id number]   Age: 28 year old YOB: 1995     Brief HPI & Initial ED Course     Chief Complaint   Patient presents with    Drug Overdose     HPI  Myra Melo is a 28 year old female with history notable for bipolar 1 disorder, PTSD, and a borderline personality disorder who is currently under observation status on the EmPATH unit, now approaching 50 hours in the emergency department.  Overnight, there were no acute issues.  On reassessment, the patient reports that her mood has maintained stability since our last meeting.  She continues to deny depressive symptoms.  She has not noted or exhibited manic symptoms.  She denied overwhelming anxiety.  She is sleeping well at night.  Appetite and energy are fair.  She discussed a desire to return to work, often working the overnight shift, and hoping to save enough money to return back to [place] to reside with friends and supports in that area.  Noting her recent overdose, she inquired if she can receive a refill of medications that she no longer has access to.  She remains satisfied with her current medication plan.  She shares with me that her outpatient provider is monitoring her response to lamotrigine with plans to gradually titrate upwards.  She prefers not to adjust the dose today and plans to see her outpatient prescriber in about 1 week.  She denies suicidal and homicidal thoughts.  There is no indication of psychosis.  She interprets readiness to discharge home today.        Physical Examination   BP: 129/84  Pulse: 83  Temp: 97.6  F (36.4  C)  Resp: 16  Height: 165.1 cm (5' 5\")  Weight: 87.8 kg (193 lb 8 oz)  SpO2: 97 %    Physical Exam  General: Appears stated age.   Neuro: Alert and fully oriented. Extremities appear to demonstrate normal strength on visual inspection.   Integumentary/Skin: no rash " visualized, normal color    Psychiatric Examination   Appearance: awake, alert  Attitude:  cooperative  Eye Contact:  fair  Mood:  better  Affect:  intensity is blunted  Speech:  clear, coherent  Psychomotor Behavior:  no evidence of tardive dyskinesia, dystonia, or tics  Thought Process:  logical and linear  Associations:  no loose associations  Thought Content:  no evidence of suicidal ideation or homicidal ideation and no evidence of psychotic thought  Insight:  fair  Judgement:  fair  Oriented to:  time, person, and place  Attention Span and Concentration:  fair  Recent and Remote Memory:  fair  Language: able to name/identify objects without impairment  Fund of Knowledge: intact with awareness of current and past events    Results     ED Course as of 12/22/23 1222   Wed Dec 20, 2023   1057 Consulted with poison control regarding patient   1709 Patient awake and willing to go to EMPATH unit.        Labs Ordered and Resulted from Time of ED Arrival to Time of ED Departure   COMPREHENSIVE METABOLIC PANEL - Abnormal       Result Value    Sodium 141      Potassium 3.9      Carbon Dioxide (CO2) 25      Anion Gap 8      Urea Nitrogen 10.6      Creatinine 0.86      GFR Estimate >90      Calcium 8.1 (*)     Chloride 108 (*)     Glucose 106 (*)     Alkaline Phosphatase 62      AST 42      ALT 40      Protein Total 6.4      Albumin 3.9      Bilirubin Total 0.2     ETHYL ALCOHOL LEVEL - Abnormal    Alcohol ethyl 0.21 (*)    ACETAMINOPHEN LEVEL - Abnormal    Acetaminophen <5.0 (*)    CBC WITH PLATELETS AND DIFFERENTIAL - Abnormal    WBC Count 4.9      RBC Count 3.53 (*)     Hemoglobin 11.8      Hematocrit 34.8 (*)     MCV 99      MCH 33.4 (*)     MCHC 33.9      RDW 12.5      Platelet Count 236      % Neutrophils 35      % Lymphocytes 55      % Monocytes 9      % Eosinophils 1      % Basophils 0      % Immature Granulocytes 0      NRBCs per 100 WBC 0      Absolute Neutrophils 1.7      Absolute Lymphocytes 2.6      Absolute  Monocytes 0.5      Absolute Eosinophils 0.1      Absolute Basophils 0.0      Absolute Immature Granulocytes 0.0      Absolute NRBCs 0.0     URINE DRUG SCREEN PANEL - Abnormal    Amphetamines Urine Screen Negative      Barbituates Urine Screen Negative      Benzodiazepine Urine Screen Negative      Cannabinoids Urine Screen Positive (*)     Cocaine Urine Screen Negative      Fentanyl Qual Urine Screen Negative      Opiates Urine Screen Negative      PCP Urine Screen Negative     SALICYLATE LEVEL - Normal    Salicylate <0.3     MAGNESIUM - Normal    Magnesium 2.1     PHOSPHORUS - Normal    Phosphorus 2.9     COVID-19 VIRUS (CORONAVIRUS) BY PCR - Normal    SARS CoV2 PCR Negative     HCG QUALITATIVE URINE - Normal    hCG Urine Qualitative Negative         Observation Course   The patient was found to have a psychiatric condition that would benefit from an observation stay in the emergency department for further psychiatric stabilization and/or coordination of a safe disposition. The plan upon observation admission included serial assessments of psychiatric condition, potential administration of medications if indicated, further disposition pending the patient's psychiatric course during the monitoring period.     Serial assessments of the patient's psychiatric condition were performed. Nursing notes were reviewed. During the observation period, the patient did not require medications for agitation, and did not require restraints/seclusion for patient and/or provider safety.     After a period of working with the treatment team on the EmPATH unit, the patient's mental state improved to allow a safe transition to outpatient care. After counseling on the diagnosis, work-up, and treatment plan, the patient was discharged. Close follow-up with a psychiatrist and/or therapist was recommended and community psychiatric resources were provided. Patient is to return to the ED if any urgent or potentially life-threatening  concerns.     Discharge Diagnoses:   Final diagnoses:   Intentional drug overdose, initial encounter (H)   Bipolar I disorder, most recent episode depressed (H)   PTSD (post-traumatic stress disorder)   Borderline personality disorder (H)       Treatment Plan:  -Continue lamotrigine 100 mg daily for mood stabilization.  We discussed optimizing the dose to aid in any underlying affective instability which could be triggered by psychosocial events.  As she is currently working with her outpatient provider on a titration plan, she prefers to follow-up with her provider in 1 week to discuss dose optimization.  -Risperdal has been restarted today at 0.5 mg daily for additional mood stabilization.  -Labs reviewed: Urine pregnancy test negative, urine drug screen positive for cannabis  -Resume outpatient medication management appointments and psychotherapy  -Discharge home today.      At the time of discharge, the patient's acute suicide risk was determined to be low due to the following factors: Reduction in the intensity of mood/anxiety symptoms that preceded the admission, denial of suicidal thoughts, denies feeling helpless or helpless, not currently under the influence of alcohol or illicit substances, denies experiencing command hallucinations, no immediate access to firearms. The patient's acute risk could be higher if noncompliant with their treatment plan, medications, follow-up appointments or using illicit substances or alcohol. Protective factors include: social supports, employment    I spent more than 30 minutes on discharge day activities.    --  Daren Coelho MD  [facility] EMERGENCY DEPT  EmPATH Unit       Daren Coelho MD  12/22/23 5564

## 2023-12-22 NOTE — TELEPHONE ENCOUNTER
R: [place]  Access Inpatient Bed Call Log 12/21/23 @10:30 pm:   Intake can search *METRO+1 for placement:  No available beds within the [facility] system.  [place] -- [facility]:  @ cap per website.  [place] -- [facility]: @cap per website  [place] -- [facility]: @ cap per website.   [place] -- [facility]: @cap per website.  [facility] -- [facility]: @ cap per website.  [place] -- [facility]/ beds - 5 beds for Young Adults aged 18-26.  Pt not appropriate age for placement.  [place] -- [facility]: @ cap per website.  [place] -- [facility]: @ cap per website.  [place] -- [facility]:  0 beds  [facility]: 0 beds available. - Mixed unit/Low acuity only.  [facility]: 1 Bed available Low acuity, No aggression.  Per call with Jazmin at 11:20PM, no bed availability  [facility]: 0 Beds available  [facility]:  1 bed available Low acuity only. No current aggression.  Per call with Jazmin at 11:20PM, no bed availability    Pt remains on waitlist pending appropriate placement availability.

## 2023-12-22 NOTE — CARE PLAN
Triage & Transition Services, Extended Care     Client Name: Myra Melo    Date: December 22, 2023    Patient was seen yes  Mode of Assessment: In person    Service Type: (P) other (see comments) (pt was sleeping)  Session Start Time:  (P) 1100    Session End Time: (P) 1130  Session Length: (P) 30  Site Location: [facility] EMERGENCY DEPT                             [id number]  Total Number ofAttendees: (P) 4  Topic:   (P) problem-solving, coping skills/lifestyle management   Response: (P) other (see comments) (pt was sleeping)     WIL Garcia   Licensed Mental Health Professional (LMHP), Extended Care  [phone]

## 2023-12-22 NOTE — PLAN OF CARE
Pt slept most of the shift. Woke to eat evening meal and make phone calls. Flat affect, feeling hopeless. On wait list for IP. U tox positive for marijuana.

## 2023-12-22 NOTE — ED NOTES
"Pt approached nursing station requesting medication for sleep and was given prn Melatonin and Zyprexa, see MAR. Pt pleasant and cooperative upon approach and stated she is \"bored here\" and wonders why they brought her to the [place] ED, rather than [facility]. Pt is able to verbalize her needs appropriately and is resting in her assigned recliner.  "

## 2025-01-19 ENCOUNTER — HEALTH MAINTENANCE LETTER (OUTPATIENT)
Age: 30
End: 2025-01-19